# Patient Record
(demographics unavailable — no encounter records)

---

## 2025-03-17 NOTE — DISCUSSION/SUMMARY
[de-identified] : DX: TENNIS ELBOW / EXTENSOR TENDINOSIS / LATERAL EPICONDYLITIS  PATIENT CONSENTED TO [facility] INJECTION - SEE PROCEDURE NOTE  POST INJECTION INSTRUCTIONS: COLD PACKS 48 HOURS PRN    TID  BROCHURE PROVIDED   TENNIS ELBOW STRAP RECOMMENDED  P.T. - 2 X 4 WEEKS - THEN HOME EXERCISES  CONSIDER PRP INJECTION

## 2025-03-17 NOTE — HISTORY OF PRESENT ILLNESS
[de-identified] :  REASON: RIGHT ELBOW  DURATION: PAIN STARTED- JANUARY 20  DESCRIPTION OF PAIN: BURNING ANY RADIATING PAIN DOWN ARM : YES PAIN LEVEL: 8/10 CONSTANT / INTERMITTENT PRIOR TREATMENTS:REST AGGRAVATING FACTORS:,GRABBING SYMPTOMS: SWELLING ASSOCIATED POPPING / CLICKING / LOCKING / INSTABILITY- N0     PREVIOUS INJECTIONS:NO PREVIOUS PHYSCIAL THERPAY:NO PREVIOUS SURGERIES:NO PREVIOUS IMAGING:YES

## 2025-03-17 NOTE — PROCEDURE
[de-identified] : INJECTION RIGHT ELBOW LATERAL EPICONDYLE DEEP TO EXTENSOR ORIGIN  Patient has demonstrated limited relief from NSAIDS, rest, exercises / PT, and after discussion of the risks and benefits, the patient has elected to proceed with an ULTRASOUND GUIDED injection into the RIGHT ELBOW LATERAL EPICONDYLE DEEP TO EXTENSOR ORIGIN   Confirmed that the patient does not have history of prior adverse reactions, active, infections, or relevant allergies. There was no effusion, erythema, or warmth, and the skin was clear  The skin was sterilized with alcohol. Ethyl Chloride was used as a topical anesthetic. Routine sterile technique.  The site was injected UTILIZING ULTRASOUND GUIDANCE to confirm appropriate placement of the needle- with a mixture of medication and local anesthetic. The injection was completed without complication and a bandage was applied.   The patient tolerated the procedure well and was given post-injection instructions.Rec: Cold therapy, analgesics, avoid heavy activity. MEDICATION: 1cc of 1% xylocaine + 10mg of Kenalog LOT# [id number] EXP MARCH 2026

## 2025-03-17 NOTE — PHYSICAL EXAM
[de-identified] : PHYSICAL EXAM RIGHT ELBOW  NO OBVIOUS DEFORMITY OR ANGULATION  NO NOTABLE SWELLING OR REDNESS  AROM  FLEXION / EXTENSION - WNL SUPINATION / PRONATION - WNL  NO VARUS / VALGUS INSTABILITY   TENDER AT THE EXTENSOR ORIGIN AT THE LATERAL EPICONDYLE INCREASED PAIN WITH RESISTED WRIST AND FINGER EXTENSION  MOTOR STRENGTH - GROSSLY NORMAL SENSATION - GROSSLY NORMAL